# Patient Record
Sex: MALE | Race: OTHER | NOT HISPANIC OR LATINO
[De-identification: names, ages, dates, MRNs, and addresses within clinical notes are randomized per-mention and may not be internally consistent; named-entity substitution may affect disease eponyms.]

---

## 2019-03-25 ENCOUNTER — LABORATORY RESULT (OUTPATIENT)
Age: 34
End: 2019-03-25

## 2019-03-25 ENCOUNTER — APPOINTMENT (OUTPATIENT)
Dept: INTERNAL MEDICINE | Facility: CLINIC | Age: 34
End: 2019-03-25
Payer: COMMERCIAL

## 2019-03-25 VITALS
BODY MASS INDEX: 23.95 KG/M2 | HEIGHT: 68 IN | TEMPERATURE: 98.8 F | OXYGEN SATURATION: 98 % | SYSTOLIC BLOOD PRESSURE: 124 MMHG | DIASTOLIC BLOOD PRESSURE: 85 MMHG | WEIGHT: 158 LBS | HEART RATE: 87 BPM

## 2019-03-25 DIAGNOSIS — Z82.49 FAMILY HISTORY OF ISCHEMIC HEART DISEASE AND OTHER DISEASES OF THE CIRCULATORY SYSTEM: ICD-10-CM

## 2019-03-25 DIAGNOSIS — R20.0 ANESTHESIA OF SKIN: ICD-10-CM

## 2019-03-25 DIAGNOSIS — Z83.438 FAMILY HISTORY OF OTHER DISORDER OF LIPOPROTEIN METABOLISM AND OTHER LIPIDEMIA: ICD-10-CM

## 2019-03-25 DIAGNOSIS — Z72.0 TOBACCO USE: ICD-10-CM

## 2019-03-25 PROCEDURE — 36415 COLL VENOUS BLD VENIPUNCTURE: CPT

## 2019-03-25 PROCEDURE — 99385 PREV VISIT NEW AGE 18-39: CPT | Mod: 25

## 2019-03-25 PROCEDURE — 93000 ELECTROCARDIOGRAM COMPLETE: CPT

## 2019-03-25 PROCEDURE — G0444 DEPRESSION SCREEN ANNUAL: CPT

## 2019-03-25 NOTE — REVIEW OF SYSTEMS
[Nasal Discharge] : nasal discharge [Fever] : no fever [Chills] : no chills [Fatigue] : no fatigue [Vision Problems] : no vision problems [Earache] : no earache [Sore Throat] : no sore throat [Chest Pain] : no chest pain [Palpitations] : no palpitations [Shortness Of Breath] : no shortness of breath [Wheezing] : no wheezing [Cough] : no cough [Abdominal Pain] : no abdominal pain [Nausea] : no nausea [Constipation] : no constipation [Diarrhea] : no diarrhea [Vomiting] : no vomiting [Heartburn] : no heartburn [Dysuria] : no dysuria [Hematuria] : no hematuria [Headache] : no headache [Dizziness] : no dizziness [Depression] : no depression

## 2019-03-25 NOTE — HISTORY OF PRESENT ILLNESS
[FreeTextEntry1] : establish care, physical [de-identified] : 34yo M presents for annual physical exam.\par He states that for the past 4-5 months he has been having episodes of "chest numbness." Denies any acute pain/pressure, palpitations, SOB, cough. He states it can happen 1-2 times per month and is usually in a small area which can be located on his left or right side, sometimes under his arm.\par He also states that for the past month he has intermittent episodes of abdominal discomfort either on his left or right upper stomach area. He notices it when he doesn't eat for long period of time and then suddenly eats. Denies heartburn, N/V/D, blood in stool, urinary complaints. He actually states that this has improved overall and he has been symptom free for the past week.\par His last physical was in 2015 and he was told that he had elevated liver enzymes, but he did not follow up after that.\par He complains of nasal congestion for the past two days, denies sore throat but does feel a tickle in his throat. Denies fevers, chills.

## 2019-03-25 NOTE — HEALTH RISK ASSESSMENT
[Employed] : employed [Feels Safe at Home] : Feels safe at home [Smoke Detector] : smoke detector [Carbon Monoxide Detector] : carbon monoxide detector [Seat Belt] :  uses seat belt [Sunscreen] : uses sunscreen [0] : 2) Feeling down, depressed, or hopeless: Not at all (0) [HIV Test offered] : HIV Test offered [Hepatitis C test offered] : Hepatitis C test offered [None] : None [With Family] : lives with family [] :  [# Of Children ___] : has [unfilled] children [Sexually Active] : sexually active [Fully functional (bathing, dressing, toileting, transferring, walking, feeding)] : Fully functional (bathing, dressing, toileting, transferring, walking, feeding) [Fully functional (using the telephone, shopping, preparing meals, housekeeping, doing laundry, using] : Fully functional and needs no help or supervision to perform IADLs (using the telephone, shopping, preparing meals, housekeeping, doing laundry, using transportation, managing medications and managing finances) [] : No [de-identified] : smokes 1-2 cigarettes per day [[id number]] : 0 [Change in mental status noted] : No change in mental status noted [Language] : denies difficulty with language [High Risk Behavior] : no high risk behavior [Reports changes in hearing] : Reports no changes in hearing [Reports changes in vision] : Reports no changes in vision [Reports changes in dental health] : Reports no changes in dental health [FreeTextEntry2] :

## 2019-03-25 NOTE — PLAN
[FreeTextEntry1] : Healthcare Maintenance\par -routine blood work, follow up results\par -EKG reviewed\par -depression screening performed- 0\par \par Chest numbness?\par -encourage hydration and better sleep hygiene (patient admits to sleep deprivation since his daughter was born)\par -if develops chest pain, palpitations, symptoms worsen- follow up in office\par \par Tobacco abuse\par -patient has cut down from 1ppd to 1-2 cigarettes per day\par -encourage complete smoking cessation, discussed nicotine patch/gum, patient will work on this himself

## 2019-03-25 NOTE — PHYSICAL EXAM
[No Acute Distress] : no acute distress [Well Nourished] : well nourished [Well Developed] : well developed [Well-Appearing] : well-appearing [Normal Sclera/Conjunctiva] : normal sclera/conjunctiva [PERRL] : pupils equal round and reactive to light [Normal Oropharynx] : the oropharynx was normal [Supple] : supple [No Respiratory Distress] : no respiratory distress  [Clear to Auscultation] : lungs were clear to auscultation bilaterally [Normal Rate] : normal rate  [Regular Rhythm] : with a regular rhythm [Normal S1, S2] : normal S1 and S2 [No Edema] : there was no peripheral edema [Soft] : abdomen soft [Non Tender] : non-tender [Non-distended] : non-distended [Normal Bowel Sounds] : normal bowel sounds [No CVA Tenderness] : no CVA  tenderness [Grossly Normal Strength/Tone] : grossly normal strength/tone [Normal Gait] : normal gait [Coordination Grossly Intact] : coordination grossly intact [No Focal Deficits] : no focal deficits [Normal Affect] : the affect was normal [Alert and Oriented x3] : oriented to person, place, and time [Normal Insight/Judgement] : insight and judgment were intact

## 2019-03-26 ENCOUNTER — RESULT REVIEW (OUTPATIENT)
Age: 34
End: 2019-03-26

## 2019-03-26 DIAGNOSIS — E55.9 VITAMIN D DEFICIENCY, UNSPECIFIED: ICD-10-CM

## 2019-03-26 DIAGNOSIS — R74.0 NONSPECIFIC ELEVATION OF LEVELS OF TRANSAMINASE AND LACTIC ACID DEHYDROGENASE [LDH]: ICD-10-CM

## 2019-03-26 LAB
25(OH)D3 SERPL-MCNC: 12.5 NG/ML
ALBUMIN SERPL ELPH-MCNC: 4.9 G/DL
ALP BLD-CCNC: 95 U/L
ALT SERPL-CCNC: 102 U/L
ANION GAP SERPL CALC-SCNC: 14 MMOL/L
AST SERPL-CCNC: 58 U/L
BASOPHILS # BLD AUTO: 0.06 K/UL
BASOPHILS NFR BLD AUTO: 0.8 %
BILIRUB SERPL-MCNC: 2 MG/DL
BUN SERPL-MCNC: 14 MG/DL
CALCIUM SERPL-MCNC: 10.4 MG/DL
CHLORIDE SERPL-SCNC: 101 MMOL/L
CHOLEST SERPL-MCNC: 152 MG/DL
CHOLEST/HDLC SERPL: 3.9 RATIO
CO2 SERPL-SCNC: 26 MMOL/L
CREAT SERPL-MCNC: 0.99 MG/DL
EOSINOPHIL # BLD AUTO: 0.34 K/UL
EOSINOPHIL NFR BLD AUTO: 4.4 %
ESTIMATED AVERAGE GLUCOSE: 123 MG/DL
GLUCOSE SERPL-MCNC: 94 MG/DL
HBA1C MFR BLD HPLC: 5.9 %
HCT VFR BLD CALC: 47.5 %
HDLC SERPL-MCNC: 39 MG/DL
HGB BLD-MCNC: 15.7 G/DL
IMM GRANULOCYTES NFR BLD AUTO: 0.3 %
LDLC SERPL CALC-MCNC: 51 MG/DL
LYMPHOCYTES # BLD AUTO: 2.49 K/UL
LYMPHOCYTES NFR BLD AUTO: 31.9 %
MAN DIFF?: NORMAL
MCHC RBC-ENTMCNC: 27.9 PG
MCHC RBC-ENTMCNC: 33.1 GM/DL
MCV RBC AUTO: 84.5 FL
MONOCYTES # BLD AUTO: 0.76 K/UL
MONOCYTES NFR BLD AUTO: 9.7 %
NEUTROPHILS # BLD AUTO: 4.14 K/UL
NEUTROPHILS NFR BLD AUTO: 52.9 %
PLATELET # BLD AUTO: 176 K/UL
POTASSIUM SERPL-SCNC: 4.3 MMOL/L
PROT SERPL-MCNC: 8 G/DL
RBC # BLD: 5.62 M/UL
RBC # FLD: 13.1 %
SODIUM SERPL-SCNC: 141 MMOL/L
TRIGL SERPL-MCNC: 309 MG/DL
TSH SERPL-ACNC: 1.74 UIU/ML
WBC # FLD AUTO: 7.81 K/UL

## 2019-03-28 LAB
BILIRUB DIRECT SERPL-MCNC: 0.4 MG/DL
BILIRUB INDIRECT SERPL-MCNC: 1.6 MG/DL
BILIRUB SERPL-MCNC: 1.9 MG/DL
HAV IGM SER QL: NONREACTIVE
HBV CORE IGM SER QL: NONREACTIVE
HBV SURFACE AG SER QL: NONREACTIVE
HCV AB SER QL: ABNORMAL
HCV S/CO RATIO: 1.99 S/CO
HIV1+2 AB SPEC QL IA.RAPID: NONREACTIVE

## 2019-04-05 ENCOUNTER — FORM ENCOUNTER (OUTPATIENT)
Age: 34
End: 2019-04-05

## 2019-04-06 ENCOUNTER — OUTPATIENT (OUTPATIENT)
Dept: OUTPATIENT SERVICES | Facility: HOSPITAL | Age: 34
LOS: 1 days | End: 2019-04-06
Payer: COMMERCIAL

## 2019-04-06 ENCOUNTER — APPOINTMENT (OUTPATIENT)
Dept: ULTRASOUND IMAGING | Facility: IMAGING CENTER | Age: 34
End: 2019-04-06
Payer: COMMERCIAL

## 2019-04-06 DIAGNOSIS — R74.0 NONSPECIFIC ELEVATION OF LEVELS OF TRANSAMINASE AND LACTIC ACID DEHYDROGENASE [LDH]: ICD-10-CM

## 2019-04-06 PROCEDURE — 76705 ECHO EXAM OF ABDOMEN: CPT

## 2019-04-06 PROCEDURE — 76705 ECHO EXAM OF ABDOMEN: CPT | Mod: 26

## 2019-04-08 ENCOUNTER — RESULT REVIEW (OUTPATIENT)
Age: 34
End: 2019-04-08

## 2019-04-17 ENCOUNTER — APPOINTMENT (OUTPATIENT)
Dept: HEPATOLOGY | Facility: CLINIC | Age: 34
End: 2019-04-17
Payer: COMMERCIAL

## 2019-04-17 VITALS
SYSTOLIC BLOOD PRESSURE: 136 MMHG | BODY MASS INDEX: 23.34 KG/M2 | RESPIRATION RATE: 14 BRPM | WEIGHT: 154 LBS | DIASTOLIC BLOOD PRESSURE: 90 MMHG | TEMPERATURE: 97.9 F | HEART RATE: 71 BPM | HEIGHT: 68 IN

## 2019-04-17 PROCEDURE — 99204 OFFICE O/P NEW MOD 45 MIN: CPT

## 2019-04-17 NOTE — ASSESSMENT
[FreeTextEntry1] : 34 yo M originally from [place], with pre-diabetes (HbA1c 5.9%) and dyslipidemia (not yet on medication), with abnormal liver enzymes and hyperbilirubinemia.\par \par He has no evidence of chronic HBV or HCV infection. HCV Ab was weakly positive but HCV RNA was not detectable, suggesting a false positive antibody test or, less likely, prior spontaneously cleared infection. Given his history of transient jaundice and possible viral hepatitis 23-24 years ago in [place], will check HBcAb, HBsAb, HAV antibodies, and HEV antibodies for further evaluation. Suspect the episode was most likely acute HAV or HEV infection in an endemic region.\par \par His mild hyperbilirubinemia is predominantly indirect and could be related to Gilbert's syndrome, though direct bilirubin level was also very mildly elevated. His albumin level is normal, suggesting against liver synthetic dysfunction. Will follow-up INR, ordered with labs today.\par \par His liver enzyme elevations are most likely related to nonalcoholic fatty liver disease (NAFLD) given his risk factors of pre-diabetes and hypertriglyceridemia, though he is within the normal range for BMI. I have ordered labs to rule out other, less common etiologies for his liver test abnormalities.\par \par I have discussed with him at length regarding nonalcoholic fatty liver disease (NAFLD). I reviewed the natural history, evaluation and staging of the disease including elastography and potential need for liver biopsy, and prognosis, including possible risks of development of compensated cirrhosis, decompensated cirrhosis, and HCC.\par \par His calculated NAFLD fibrosis score is 0.239, which is indeterminate. I have ordered MR elastography for further evaluation and staging of his disease.\par \par I have discussed with him that lifestyle modifications are crucial for management of NAFLD. I recommended for him to maintain a healthy BMI and waist circumference. I recommended a heart-healthy, Mediterranean diet with lean proteins, healthy complex carbohydrates, avoidance of sugary foods and fructose / corn syrup, and avoidance of calorie-containing beverages. Coffee consumption was encouraged. I encouraged him to continue exercising, and to ensure that he is doing moderate or strenuous intensity exercise for at least 20 minutes at least 3 times per week. These changes have been shown to lead to regression or even resolution of steatosis, inflammation, and even fibrosis in some patients.\par \par We will discuss the possibility of pharmacologic treatments and/or clinical trial enrollment pending his laboratory and MRE results.\par \par He will return to clinic for follow-up in 4-6 weeks.

## 2019-04-17 NOTE — HISTORY OF PRESENT ILLNESS
[Travel to Endemic Area] : travel to an endemic area [Needlestick Exposure] : no needlestick exposure [Infected Sexual Partner] : no infected sexual partner [IV Drug Use] : no IV drug use [Tattoo] : no tattoos [Body Piercing] : no body piercing [Hemodialysis] : no hemodialysis [Transfusion before 1992] : no transfusion before 1992 [Incarceration] : no incarceration [Transplant before 1992] : no transplant before 1992 [Alcohol Abuse] : no alcohol abuse [Autoimmune Disorder] : no autoimmune disorder [Household Contact to HBV] : no household contact to HBV [Occupational Exposure] : no occupational exposure [Cocaine Use] : no cocaine use [de-identified] : Mr. Farr is a 32 yo M with recently-diagnosed pre-DM (HbA1c 5.9% on 3/25/19) and dyslipidemia (not yet on medical therapy), who was referred to Liver clinic due to abnormal liver enzymes and abnormal abdominal sonogram. His AST and ALT were elevated to 58 and 102, respectively, on routine labs done by his PCP (Dr. Rhonda Goldberg) on 3/25/19. He also had a mild, predominantly indirect hyperbilirubinemia (Tbili 1.9, DB 0.4). On prior labs from 12/31/15, he had elevated AST and ALT of 55 and 98, respectively, but bilirubin was within normal limits at 0.8.\par \par He reports feeling well with no complaints. He does report a remote history of acute likely viral hepatitis in 1995 or 1996, when he was still living in [facility], with jaundice and associated symptoms of fevers, RUQ pain, and nausea/vomiting, that subsequently resolved without need for antiviral treatment. His father had a similar illness around the same time. He otherwise has no history of jaundice, scleral icterus, overt GI bleeding, confusion, abdominal distension, or lower extremity swelling, and no other known FH of liver diseases.\par \par [place] from [place] to [facility] in [place]. He works as a  in the [employer] in [employer]. He has a female significant other and a 21-month-old daughter. He drinks alcohol on social occasions 1-2 times/month, drinking up to 4-5 alcoholic beverages per evening on those occasions. He used to smoke 1 ppd but cut down to 1-2 cigarettes/day. He denies any history of illicit drug use. \par \par He reports that his weight has been more or less stable recently. He recently started running and also joined a gym.\par \par RUQ US (4/6/19): Diffuse fatty infiltration of the liver. No hepatomegaly or focal mass. No ascites.

## 2019-04-17 NOTE — CONSULT LETTER
[Dear  ___] : Dear  [unfilled], [( Thank you for referring [unfilled] for consultation for _____ )] : Thank you for referring [unfilled] for consultation for [unfilled] [Please see my note below.] : Please see my note below. [Consult Closing:] : Thank you very much for allowing me to participate in the care of this patient.  If you have any questions, please do not hesitate to contact me. [FreeTextEntry3] : Sincerely,\par \par Vazquez Mari M.D., Ph.D.\par [facility]\par [address]\[place] [address]\par Tel: [phone]\par Fax: [phone]\par Cell: [phone]\par E-mail: [email or web address]  [FreeTextEntry2] : Dr. Rhonda Goldberg

## 2019-04-18 LAB
AFP-TM SERPL-MCNC: 1.8 NG/ML
ALBUMIN SERPL ELPH-MCNC: 5 G/DL
ALP BLD-CCNC: 86 U/L
ALT SERPL-CCNC: 96 U/L
ANION GAP SERPL CALC-SCNC: 17 MMOL/L
AST SERPL-CCNC: 55 U/L
BILIRUB DIRECT SERPL-MCNC: 0.3 MG/DL
BILIRUB SERPL-MCNC: 1.2 MG/DL
BUN SERPL-MCNC: 13 MG/DL
CALCIUM SERPL-MCNC: 9.7 MG/DL
CERULOPLASMIN SERPL-MCNC: 21 MG/DL
CHLORIDE SERPL-SCNC: 103 MMOL/L
CO2 SERPL-SCNC: 23 MMOL/L
CREAT SERPL-MCNC: 1.07 MG/DL
FERRITIN SERPL-MCNC: 135 NG/ML
GGT SERPL-CCNC: 60 U/L
GLUCOSE SERPL-MCNC: 104 MG/DL
HAV IGM SER QL: NONREACTIVE
HBV CORE IGG+IGM SER QL: NONREACTIVE
HBV SURFACE AB SERPL IA-ACNC: 8.2 MIU/ML
HEPATITIS A IGG ANTIBODY: REACTIVE
IGA SER QL IEP: 198 MG/DL
IGG SER QL IEP: 1214 MG/DL
IGM SER QL IEP: 120 MG/DL
INR PPP: 0.94 RATIO
IRON SATN MFR SERPL: 20 %
IRON SERPL-MCNC: 77 UG/DL
POTASSIUM SERPL-SCNC: 4.6 MMOL/L
PROT SERPL-MCNC: 7.5 G/DL
PT BLD: 10.8 SEC
SODIUM SERPL-SCNC: 143 MMOL/L
TIBC SERPL-MCNC: 384 UG/DL
UIBC SERPL-MCNC: 307 UG/DL

## 2019-05-09 LAB
A1AT PHENOTYP SERPL-IMP: NORMAL BANDS
A1AT SERPL-MCNC: 101 MG/DL
HEV AB SER QL: NEGATIVE
LYSOSOMAL ACID LIPASE INTERPRETATION: NORMAL
LYSOSOMAL ACID LIPASE: 134 CD:384473389
MITOCHONDRIA AB SER IF-ACNC: NORMAL
SMOOTH MUSCLE AB SER QL IF: NORMAL

## 2019-05-13 ENCOUNTER — APPOINTMENT (OUTPATIENT)
Dept: MRI IMAGING | Facility: CLINIC | Age: 34
End: 2019-05-13

## 2019-05-15 ENCOUNTER — APPOINTMENT (OUTPATIENT)
Dept: MRI IMAGING | Facility: CLINIC | Age: 34
End: 2019-05-15
Payer: COMMERCIAL

## 2019-05-15 ENCOUNTER — OUTPATIENT (OUTPATIENT)
Dept: OUTPATIENT SERVICES | Facility: HOSPITAL | Age: 34
LOS: 1 days | End: 2019-05-15
Payer: COMMERCIAL

## 2019-05-15 DIAGNOSIS — E80.6 OTHER DISORDERS OF BILIRUBIN METABOLISM: ICD-10-CM

## 2019-05-15 DIAGNOSIS — K76.0 FATTY (CHANGE OF) LIVER, NOT ELSEWHERE CLASSIFIED: ICD-10-CM

## 2019-05-15 PROCEDURE — 74183 MRI ABD W/O CNTR FLWD CNTR: CPT | Mod: 26

## 2019-05-15 PROCEDURE — A9585: CPT

## 2019-05-15 PROCEDURE — 76391 MR ELASTOGRAPHY: CPT

## 2019-05-15 PROCEDURE — 76391 MR ELASTOGRAPHY: CPT | Mod: 26

## 2019-05-15 PROCEDURE — 74183 MRI ABD W/O CNTR FLWD CNTR: CPT

## 2019-05-24 ENCOUNTER — APPOINTMENT (OUTPATIENT)
Age: 34
End: 2019-05-24
Payer: COMMERCIAL

## 2019-05-24 VITALS
WEIGHT: 145 LBS | HEART RATE: 63 BPM | TEMPERATURE: 98.1 F | SYSTOLIC BLOOD PRESSURE: 145 MMHG | BODY MASS INDEX: 21.98 KG/M2 | DIASTOLIC BLOOD PRESSURE: 89 MMHG | HEIGHT: 68 IN

## 2019-05-24 DIAGNOSIS — E80.6 OTHER DISORDERS OF BILIRUBIN METABOLISM: ICD-10-CM

## 2019-05-24 DIAGNOSIS — R73.03 PREDIABETES.: ICD-10-CM

## 2019-05-24 PROCEDURE — 90739 HEPB VACC 2/4 DOSE ADULT IM: CPT

## 2019-05-24 PROCEDURE — ZZZZZ: CPT

## 2019-05-24 PROCEDURE — 90471 IMMUNIZATION ADMIN: CPT

## 2019-05-24 PROCEDURE — 99214 OFFICE O/P EST MOD 30 MIN: CPT | Mod: 25

## 2019-05-24 NOTE — CONSULT LETTER
[Courtesy Letter:] : I had the pleasure of seeing your patient, [unfilled], in my office today. [Dear  ___] : Dear  [unfilled], [Please see my note below.] : Please see my note below. [Consult Closing:] : Thank you very much for allowing me to participate in the care of this patient.  If you have any questions, please do not hesitate to contact me. [FreeTextEntry2] : Dr. Rhonda Goldberg [FreeTextEntry3] : Sincerely,\par \par Vazqeuz Mari M.D., Ph.D.\par [facility]\par [address]\[place] [address]\par Tel: [phone]\par Fax: [phone]\par Cell: [phone]\par E-mail: [email or web address]\par

## 2019-05-24 NOTE — ASSESSMENT
[FreeTextEntry1] : 32 yo M originally from [facility], with pre-diabetes (HbA1c 5.9%) and dyslipidemia (not yet on medication), with abnormal liver enzymes and hyperbilirubinemia.\par \par He has no evidence of chronic HBV or HCV infection. HCV Ab was weakly positive but HCV RNA was not detectable, suggesting a false positive antibody test or, less likely, prior spontaneously cleared infection. He gave a history of transient jaundice and possible viral hepatitis as a child in [facility], but this may have been HAV as his serologies show HAV IgG positivity (now immune) but no evidence of prior exposure to HBV (sAg-, cAb-, sAb with low titer) or HEV (Ab negative). Laboratory work-up for other causes of his abnormal liver enzymes was negative\par \par His prior mild hyperbilirubinemia was predominantly indirect and likely related to Gilbert's syndrome. It has since resolved. His liver synthetic function remains normal.\par \par Given the laboratory work-up above and the results of his recent MRI/MRE, his liver enzyme elevations appear to be due to nonalcoholic fatty liver disease (NAFLD). Although he is within normal range for BMI, his risk factors include pre-diabetes and hypertriglyceridemia.\par \par I have discussed with him at length regarding nonalcoholic fatty liver disease (NAFLD). I reviewed the natural history, evaluation and staging of the disease including his recent MR elastography results which suggest that he has simple steatosis rather than nonalcoholic steatohepatitis (METCALF). We discussed prognosis, including possible risks of development of METCALF, compensated cirrhosis, decompensated cirrhosis, and HCC, as well as increased lifetime risks of cardiac disease and cancer with NAFLD.\par \par I have discussed with him that lifestyle modifications are crucial for management of NAFLD. I congratulated him on his changes thus far, including his recent weight loss through healthy dietary modifications and increased exercise. I recommended for him to maintain a healthy BMI and waist circumference. I recommended a heart-healthy, Mediterranean diet with lean proteins, healthy complex carbohydrates, avoidance of sugary foods and fructose / corn syrup, and avoidance of calorie-containing beverages. Coffee consumption was encouraged. I encouraged him to continue exercising, and to ensure that he is doing moderate or strenuous intensity exercise for at least 20 minutes at least 3 times per week. These changes have been shown to lead to regression or even resolution of steatosis.\par \par Mr. LEAVITT was counseled to: drink alcohol only in moderation (<2 drinks/day for men); avoid use of herbal and dietary supplements due to potential hepatotoxicity; and limit use of acetaminophen to <2 grams per day.\par \par He is non-immune to HBV and will receive the first injection today.\par \par He will return to clinic for follow-up in 1 year, with FibroScan at that time to assess for change in his hepatic steatosis and to monitor for any development of early fibrosis. He was also advised to have liver tests re-checked by his PMD Dr. Goldberg in 6 months.

## 2019-05-24 NOTE — HISTORY OF PRESENT ILLNESS
[Travel to Endemic Area] : travel to an endemic area [Needlestick Exposure] : no needlestick exposure [Infected Sexual Partner] : no infected sexual partner [IV Drug Use] : no IV drug use [Tattoo] : no tattoos [Body Piercing] : no body piercing [Hemodialysis] : no hemodialysis [Transfusion before 1992] : no transfusion before 1992 [Incarceration] : no incarceration [Transplant before 1992] : no transplant before 1992 [Alcohol Abuse] : no alcohol abuse [Autoimmune Disorder] : no autoimmune disorder [Occupational Exposure] : no occupational exposure [Household Contact to HBV] : no household contact to HBV [Cocaine Use] : no cocaine use [de-identified] : Mr. Farr is a 32 yo M with pre-DM (HbA1c 5.9% on 3/25/19) and dyslipidemia (not yet on medical therapy), who is being seen due to abnormal liver enzymes.\par \par PCP: Dr. Rhonda Goldberg = referring provider\par \par He was last seen in Liver clinic on 4/17/19. Since then, he underwent MRI/MRE on 5/15/19 that showed normal liver morphology, no focal hepatic lesion, hepatic fat fraction 16-18% consistent with moderate to severe steatosis, no hepatic iron deposition, and hepatic stiffness of 2.4 kPA (consistent with F0, or normal).\par \par Since his last visit, he started running 1.5 miles daily (usually takes him 12 minutes) and also doing some calisthenics like push-ups occasionally. He has also cut down on sugary foods and carbohydrates, including eating rice less often with his traditional  meals. He has successfully lost 9 lbs since his last visit.

## 2019-10-30 ENCOUNTER — APPOINTMENT (OUTPATIENT)
Dept: PULMONOLOGY | Facility: CLINIC | Age: 34
End: 2019-10-30
Payer: COMMERCIAL

## 2019-10-30 VITALS
DIASTOLIC BLOOD PRESSURE: 91 MMHG | SYSTOLIC BLOOD PRESSURE: 144 MMHG | HEART RATE: 70 BPM | TEMPERATURE: 98 F | OXYGEN SATURATION: 99 % | RESPIRATION RATE: 16 BRPM

## 2019-10-30 DIAGNOSIS — R06.00 DYSPNEA, UNSPECIFIED: ICD-10-CM

## 2019-10-30 PROCEDURE — 99244 OFF/OP CNSLTJ NEW/EST MOD 40: CPT | Mod: 25

## 2019-10-30 PROCEDURE — 71046 X-RAY EXAM CHEST 2 VIEWS: CPT

## 2019-10-30 PROCEDURE — 94729 DIFFUSING CAPACITY: CPT

## 2019-10-30 PROCEDURE — 94727 GAS DIL/WSHOT DETER LNG VOL: CPT

## 2019-10-30 PROCEDURE — 88738 HGB QUANT TRANSCUTANEOUS: CPT

## 2019-10-30 PROCEDURE — 94060 EVALUATION OF WHEEZING: CPT

## 2019-10-31 ENCOUNTER — APPOINTMENT (OUTPATIENT)
Dept: INTERNAL MEDICINE | Facility: CLINIC | Age: 34
End: 2019-10-31
Payer: COMMERCIAL

## 2019-10-31 VITALS
BODY MASS INDEX: 21.98 KG/M2 | WEIGHT: 145 LBS | HEART RATE: 86 BPM | HEIGHT: 68 IN | SYSTOLIC BLOOD PRESSURE: 141 MMHG | DIASTOLIC BLOOD PRESSURE: 96 MMHG | OXYGEN SATURATION: 96 % | TEMPERATURE: 98 F

## 2019-10-31 PROCEDURE — 36415 COLL VENOUS BLD VENIPUNCTURE: CPT

## 2019-10-31 PROCEDURE — 99213 OFFICE O/P EST LOW 20 MIN: CPT | Mod: 25

## 2019-10-31 RX ORDER — CHOLECALCIFEROL (VITAMIN D3) 1250 MCG
1.25 MG CAPSULE ORAL
Qty: 6 | Refills: 0 | Status: DISCONTINUED | COMMUNITY
Start: 2019-03-26 | End: 2019-10-31

## 2019-10-31 NOTE — CONSULT LETTER
[Dear  ___] : Dear  [unfilled], [Courtesy Letter:] : I had the pleasure of seeing your patient, [unfilled], in my office today. [Please see my note below.] : Please see my note below. [Consult Closing:] : Thank you very much for allowing me to participate in the care of this patient.  If you have any questions, please do not hesitate to contact me. [Sincerely,] : Sincerely, [FreeTextEntry3] : Dr. Carmelita Mccann

## 2019-10-31 NOTE — PHYSICAL EXAM
[No Acute Distress] : no acute distress [Well Nourished] : well nourished [Well Developed] : well developed [Well-Appearing] : well-appearing [No Respiratory Distress] : no respiratory distress  [No Accessory Muscle Use] : no accessory muscle use [Clear to Auscultation] : lungs were clear to auscultation bilaterally [Normal Rate] : normal rate  [Regular Rhythm] : with a regular rhythm [Normal S1, S2] : normal S1 and S2 [No Focal Deficits] : no focal deficits [Alert and Oriented x3] : oriented to person, place, and time

## 2019-10-31 NOTE — HISTORY OF PRESENT ILLNESS
[FreeTextEntry1] : Minnie is a pleasant 33-year-old [facility]  without significant past medical history, he was found to have mildly decreased FEV1 on PFT performed at a [facility] preemployment physical. He is here for pulmonary evaluation today. He appears comfortable, only has mild exertional dyspnea at times, no chest pain, cough, fever or chills.

## 2019-10-31 NOTE — ASSESSMENT
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

## 2019-10-31 NOTE — PROCEDURE
[FreeTextEntry1] : Chest x-ray PA and lateral views performed in my office today showed clear lungs, no evidence of infiltrates or pleural effusions.\par \par Pulmonary Function Test performed in my office today: Lung Volume: Within normal limits; Spirometry: Within normal limits with no improvement post bronchodilator, specifically, FEV1 is normal at 108% predicted; Diffusion: Within normal limits. SpO2 at rest on room air is 99%\par

## 2019-10-31 NOTE — HISTORY OF PRESENT ILLNESS
[FreeTextEntry1] : follow up [de-identified] : Patient presents for follow up visit, he needs paperwork signed for work. He will be undergoing a fitness test at work and requires clearance to do so. For h/o elevated LFTs he has been worked up by hepatologist Dr Mari. He is s/p fiberscan and imaging with diagnosis of NAFLD and advised on lifestyle modifications. He was told to have repeat LFTs in 6 months, he will have hepatology follow up for repeat imaging in 1 year. Denies any acute complaints today.

## 2019-11-01 LAB
ALBUMIN SERPL ELPH-MCNC: 4.6 G/DL
ALP BLD-CCNC: 75 U/L
ALT SERPL-CCNC: 82 U/L
AST SERPL-CCNC: 46 U/L
BILIRUB DIRECT SERPL-MCNC: 0.2 MG/DL
BILIRUB INDIRECT SERPL-MCNC: 0.9 MG/DL
BILIRUB SERPL-MCNC: 1.2 MG/DL
POCT - HEMOGLOBIN (HGB), QUANTITATIVE, TRANSCUTANEOUS: 13.9
PROT SERPL-MCNC: 7.1 G/DL

## 2020-05-20 ENCOUNTER — APPOINTMENT (OUTPATIENT)
Dept: HEPATOLOGY | Facility: CLINIC | Age: 35
End: 2020-05-20

## 2020-06-30 ENCOUNTER — APPOINTMENT (OUTPATIENT)
Dept: HEPATOLOGY | Facility: CLINIC | Age: 35
End: 2020-06-30

## 2020-08-03 ENCOUNTER — APPOINTMENT (OUTPATIENT)
Dept: HEPATOLOGY | Facility: CLINIC | Age: 35
End: 2020-08-03
Payer: COMMERCIAL

## 2020-08-03 PROCEDURE — 91200 LIVER ELASTOGRAPHY: CPT

## 2020-08-04 LAB
ALBUMIN SERPL ELPH-MCNC: 4.9 G/DL
ALP BLD-CCNC: 77 U/L
ALT SERPL-CCNC: 93 U/L
AST SERPL-CCNC: 53 U/L
BILIRUB DIRECT SERPL-MCNC: 0.2 MG/DL
BILIRUB INDIRECT SERPL-MCNC: 0.7 MG/DL
BILIRUB SERPL-MCNC: 1 MG/DL
PROT SERPL-MCNC: 7.3 G/DL

## 2020-08-19 ENCOUNTER — APPOINTMENT (OUTPATIENT)
Dept: HEPATOLOGY | Facility: CLINIC | Age: 35
End: 2020-08-19

## 2020-09-09 ENCOUNTER — APPOINTMENT (OUTPATIENT)
Dept: HEPATOLOGY | Facility: CLINIC | Age: 35
End: 2020-09-09
Payer: COMMERCIAL

## 2020-09-09 VITALS
DIASTOLIC BLOOD PRESSURE: 94 MMHG | OXYGEN SATURATION: 99 % | WEIGHT: 158 LBS | BODY MASS INDEX: 23.95 KG/M2 | HEART RATE: 71 BPM | HEIGHT: 68 IN | TEMPERATURE: 97 F | SYSTOLIC BLOOD PRESSURE: 150 MMHG

## 2020-09-09 DIAGNOSIS — R74.8 ABNORMAL LEVELS OF OTHER SERUM ENZYMES: ICD-10-CM

## 2020-09-09 PROCEDURE — 99213 OFFICE O/P EST LOW 20 MIN: CPT

## 2020-09-09 NOTE — HISTORY OF PRESENT ILLNESS
[Travel to Endemic Area] : travel to an endemic area [Needlestick Exposure] : no needlestick exposure [Infected Sexual Partner] : no infected sexual partner [IV Drug Use] : no IV drug use [Tattoo] : no tattoos [Body Piercing] : no body piercing [Hemodialysis] : no hemodialysis [Transfusion before 1992] : no transfusion before 1992 [Transplant before 1992] : no transplant before 1992 [Incarceration] : no incarceration [Alcohol Abuse] : no alcohol abuse [Autoimmune Disorder] : no autoimmune disorder [Household Contact to HBV] : no household contact to HBV [Occupational Exposure] : no occupational exposure [de-identified] : Mr. Farr is a 34 yo M with pre-DM (HbA1c 5.9% on 3/25/19) and dyslipidemia (not yet on medical therapy), who is being seen due to abnormal liver enzymes.\par \par PCP: Dr. Rhonda Goldberg = referring provider\par \par MRI/MRE (5/15/19): Normal liver morphology, no focal hepatic lesion, hepatic fat fraction 16-18% consistent with moderate to severe steatosis, no hepatic iron deposition, and hepatic stiffness of 2.4 kPA (consistent with F0, or normal).\par \par He was last seen by me on 5/24/19. Prior to that visit, he had previously lost some weight and had been running 1.5 miles/day and doing some calisthenics, and had also cut down on sugary foods and carbohydrates, including eating rice less often with his traditional [place] meals.\par \par Since his last visit, he regained 13 lbs. He says that he "got lazy" especially during the COVID-19 pandemic and has not been exercising at all anymore, even though there is a gym room that he could use where he works as an  for [employer]. 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 He feels that his diet has been overall steady recently, still trying to eat less rice and doesn't eat junk food, but he is not eating much fresh vegetables. He only drank alcohol twice in 2020 so far.\par \par He underwent FibroScan recently on 8/3/20 that showed median liver stiffness of 5.7 kPA (consistent with F0-1 fibrosis) and CAP score of 318 dB/m (consistent with S1 steatosis). [Cocaine Use] : no cocaine use

## 2020-09-09 NOTE — CONSULT LETTER
[Dear  ___] : Dear  [unfilled], [Please see my note below.] : Please see my note below. [Courtesy Letter:] : I had the pleasure of seeing your patient, [unfilled], in my office today. [Consult Closing:] : Thank you very much for allowing me to participate in the care of this patient.  If you have any questions, please do not hesitate to contact me. [FreeTextEntry2] : Dr. Rhonda Goldberg [FreeTextEntry3] : Sincerely,\par \par Vazquez Mari M.D., Ph.D.\par [facility]\par [address]\[place] [address]\par Tel: [phone]\par Fax: [phone]\par Cell: [phone]\par E-mail: [email or web address]\par

## 2020-09-09 NOTE — ASSESSMENT
[FreeTextEntry1] : 35 yo M originally from [facility], with pre-diabetes (HbA1c 5.9%) and dyslipidemia (not yet on medication), with abnormal liver enzymes secondary to nonalcoholic fatty liver disease (NAFLD), also with prior predominantly indirect hyperbilirubinemia likely secondary to Gilbert's syndrome.\par \par We discussed the diagnosis of nonalcoholic fatty liver disease (NAFLD) at length today. I reviewed the natural history, evaluation and staging of the disease including his FibroScan results, and prognosis, including possible risks of development of compensated cirrhosis, decompensated cirrhosis, and hepatocellular carcinoma (HCC) as well as increased risks of cardiovascular disease and non-hepatic malignancies.\par \par We discussed that lifestyle modifications are crucial for management of NAFLD. I recommended gradual weight loss of 5-10% of his body weight. I recommended dietary changes including coffee consumption (up to 3-4 cups/day if desired), adherence to a Mediterranean style diet with increased consumption of vegetables and lean proteins, avoidance of red meat and high fructose corn syrup, and avoidance of calorie-containing beverages. I recommended moderate intensity exercise for a minimum of 20-30 minutes at least 3 times per week. These changes have been shown to lead to regression or even resolution of steatosis, inflammation, and even fibrosis in some patients.\par \par Mr. LEAVITT was counseled to: drink alcohol only in moderation (<2 drinks/day for men); avoid use of herbal and dietary supplements due to potential hepatotoxicity; and limit use of acetaminophen to <2 grams per day.\par \par He is non-immune to HBV and needs vaccination.\par \par He will return for follow-up in 1 year, with same-day FibroScan. Will re-check liver tests and HbA1c in 6 and 12 months.

## 2020-09-23 ENCOUNTER — APPOINTMENT (OUTPATIENT)
Dept: INTERNAL MEDICINE | Facility: CLINIC | Age: 35
End: 2020-09-23

## 2020-09-29 ENCOUNTER — APPOINTMENT (OUTPATIENT)
Dept: INTERNAL MEDICINE | Facility: CLINIC | Age: 35
End: 2020-09-29
Payer: COMMERCIAL

## 2020-09-29 VITALS
DIASTOLIC BLOOD PRESSURE: 85 MMHG | SYSTOLIC BLOOD PRESSURE: 129 MMHG | OXYGEN SATURATION: 99 % | WEIGHT: 160 LBS | HEIGHT: 68 IN | HEART RATE: 74 BPM | BODY MASS INDEX: 24.25 KG/M2 | TEMPERATURE: 97.8 F

## 2020-09-29 DIAGNOSIS — Z00.00 ENCOUNTER FOR GENERAL ADULT MEDICAL EXAMINATION W/OUT ABNORMAL FINDINGS: ICD-10-CM

## 2020-09-29 DIAGNOSIS — Z23 ENCOUNTER FOR IMMUNIZATION: ICD-10-CM

## 2020-09-29 DIAGNOSIS — K76.0 FATTY (CHANGE OF) LIVER, NOT ELSEWHERE CLASSIFIED: ICD-10-CM

## 2020-09-29 DIAGNOSIS — Z13.31 ENCOUNTER FOR SCREENING FOR DEPRESSION: ICD-10-CM

## 2020-09-29 PROCEDURE — 99395 PREV VISIT EST AGE 18-39: CPT | Mod: 25

## 2020-09-29 PROCEDURE — G0444 DEPRESSION SCREEN ANNUAL: CPT

## 2020-09-29 PROCEDURE — G0008: CPT

## 2020-09-29 PROCEDURE — 90686 IIV4 VACC NO PRSV 0.5 ML IM: CPT

## 2020-09-29 PROCEDURE — 36415 COLL VENOUS BLD VENIPUNCTURE: CPT

## 2020-09-29 NOTE — PHYSICAL EXAM
[No Acute Distress] : no acute distress [Well Nourished] : well nourished [Well Developed] : well developed [Normal Sclera/Conjunctiva] : normal sclera/conjunctiva [PERRL] : pupils equal round and reactive to light [Normal Outer Ear/Nose] : the outer ears and nose were normal in appearance [No Respiratory Distress] : no respiratory distress  [No Accessory Muscle Use] : no accessory muscle use [Clear to Auscultation] : lungs were clear to auscultation bilaterally [Normal Rate] : normal rate  [Regular Rhythm] : with a regular rhythm [Normal S1, S2] : normal S1 and S2 [No Edema] : there was no peripheral edema [Soft] : abdomen soft [Non Tender] : non-tender [Non-distended] : non-distended [Normal Bowel Sounds] : normal bowel sounds [No CVA Tenderness] : no CVA  tenderness [Grossly Normal Strength/Tone] : grossly normal strength/tone [Coordination Grossly Intact] : coordination grossly intact [No Focal Deficits] : no focal deficits [Normal Gait] : normal gait [Normal Affect] : the affect was normal [Alert and Oriented x3] : oriented to person, place, and time [Normal Insight/Judgement] : insight and judgment were intact

## 2020-09-29 NOTE — HISTORY OF PRESENT ILLNESS
[FreeTextEntry1] : CPE [de-identified] : Patient presents for CPE. \par He has no acute complaints today.\par He was seen by Dr Mari recently for transaminitis, to follow up in 1 year and will repeat fibroscan at that time. He is not immune to hep B, states he did start series with Dr Mari but unsure?

## 2020-09-29 NOTE — HEALTH RISK ASSESSMENT
[] : Yes [Yes] : Yes [Monthly or less (1 pt)] : Monthly or less (1 point) [1 or 2 (0 pts)] : 1 or 2 (0 points) [Never (0 pts)] : Never (0 points) [No] : In the past 12 months have you used drugs other than those required for medical reasons? No [0] : 2) Feeling down, depressed, or hopeless: Not at all (0) [None] : None [With Family] : lives with family [Employed] : employed [] :  [# Of Children ___] : has [unfilled] children [Feels Safe at Home] : Feels safe at home [de-identified] : 1-2 cigarettes per day [Audit-CScore] : 1 [[id number]] : 0 [Change in mental status noted] : No change in mental status noted [Language] : denies difficulty with language [Reports changes in hearing] : Reports no changes in hearing [Reports changes in vision] : Reports no changes in vision [Reports changes in dental health] : Reports no changes in dental health

## 2020-09-29 NOTE — PLAN
[FreeTextEntry1] : HCM\par -routine labs\par -depression screen negative\par -flu shot today\par -unsure of Hep B vaccine status, will reach out to Dr Mari and he will return for vaccination as needed\par NAFLD\par -encourage lifestyle modifications\par -hepatology follow up 1 year

## 2020-09-30 LAB
25(OH)D3 SERPL-MCNC: 21 NG/ML
ALBUMIN SERPL ELPH-MCNC: 5 G/DL
ALP BLD-CCNC: 91 U/L
ALT SERPL-CCNC: 97 U/L
ANION GAP SERPL CALC-SCNC: 13 MMOL/L
AST SERPL-CCNC: 45 U/L
BASOPHILS # BLD AUTO: 0.05 K/UL
BASOPHILS NFR BLD AUTO: 0.6 %
BILIRUB SERPL-MCNC: 0.8 MG/DL
BUN SERPL-MCNC: 14 MG/DL
CALCIUM SERPL-MCNC: 9.8 MG/DL
CHLORIDE SERPL-SCNC: 104 MMOL/L
CHOLEST SERPL-MCNC: 142 MG/DL
CHOLEST/HDLC SERPL: 4.5 RATIO
CO2 SERPL-SCNC: 25 MMOL/L
CREAT SERPL-MCNC: 1 MG/DL
EOSINOPHIL # BLD AUTO: 0.19 K/UL
EOSINOPHIL NFR BLD AUTO: 2.3 %
ESTIMATED AVERAGE GLUCOSE: 120 MG/DL
GLUCOSE SERPL-MCNC: 106 MG/DL
HBA1C MFR BLD HPLC: 5.8 %
HCT VFR BLD CALC: 46.6 %
HDLC SERPL-MCNC: 31 MG/DL
HGB BLD-MCNC: 14.7 G/DL
IMM GRANULOCYTES NFR BLD AUTO: 0.2 %
LDLC SERPL CALC-MCNC: NORMAL MG/DL
LYMPHOCYTES # BLD AUTO: 3.8 K/UL
LYMPHOCYTES NFR BLD AUTO: 45.8 %
MAN DIFF?: NORMAL
MCHC RBC-ENTMCNC: 27.6 PG
MCHC RBC-ENTMCNC: 31.5 GM/DL
MCV RBC AUTO: 87.4 FL
MONOCYTES # BLD AUTO: 0.56 K/UL
MONOCYTES NFR BLD AUTO: 6.8 %
NEUTROPHILS # BLD AUTO: 3.67 K/UL
NEUTROPHILS NFR BLD AUTO: 44.3 %
PLATELET # BLD AUTO: 172 K/UL
POTASSIUM SERPL-SCNC: 4.5 MMOL/L
PROT SERPL-MCNC: 7.1 G/DL
RBC # BLD: 5.33 M/UL
RBC # FLD: 13.2 %
SODIUM SERPL-SCNC: 142 MMOL/L
TRIGL SERPL-MCNC: 446 MG/DL
TSH SERPL-ACNC: 3.52 UIU/ML
WBC # FLD AUTO: 8.29 K/UL

## 2020-10-06 ENCOUNTER — APPOINTMENT (OUTPATIENT)
Dept: INTERNAL MEDICINE | Facility: CLINIC | Age: 35
End: 2020-10-06
Payer: COMMERCIAL

## 2020-10-06 DIAGNOSIS — E78.5 HYPERLIPIDEMIA, UNSPECIFIED: ICD-10-CM

## 2020-10-06 LAB
CHOLEST SERPL-MCNC: 149 MG/DL
CHOLEST/HDLC SERPL: 3.9 RATIO
HDLC SERPL-MCNC: 39 MG/DL
LDLC SERPL CALC-MCNC: 59 MG/DL
TRIGL SERPL-MCNC: 256 MG/DL

## 2020-10-06 PROCEDURE — 90746 HEPB VACCINE 3 DOSE ADULT IM: CPT

## 2020-10-06 PROCEDURE — 90471 IMMUNIZATION ADMIN: CPT

## 2020-10-06 PROCEDURE — 36415 COLL VENOUS BLD VENIPUNCTURE: CPT

## 2020-11-12 ENCOUNTER — APPOINTMENT (OUTPATIENT)
Dept: INTERNAL MEDICINE | Facility: CLINIC | Age: 35
End: 2020-11-12
Payer: COMMERCIAL

## 2020-11-12 DIAGNOSIS — Z23 ENCOUNTER FOR IMMUNIZATION: ICD-10-CM

## 2020-11-12 PROCEDURE — 99072 ADDL SUPL MATRL&STAF TM PHE: CPT

## 2020-11-12 PROCEDURE — 90471 IMMUNIZATION ADMIN: CPT

## 2020-11-12 PROCEDURE — 90746 HEPB VACCINE 3 DOSE ADULT IM: CPT

## 2021-05-27 ENCOUNTER — APPOINTMENT (OUTPATIENT)
Dept: INTERNAL MEDICINE | Facility: CLINIC | Age: 36
End: 2021-05-27
Payer: COMMERCIAL

## 2021-05-27 ENCOUNTER — NON-APPOINTMENT (OUTPATIENT)
Age: 36
End: 2021-05-27

## 2021-05-27 LAB
ESTIMATED AVERAGE GLUCOSE: 120 MG/DL
HBA1C MFR BLD HPLC: 5.8 %

## 2021-05-27 PROCEDURE — 90471 IMMUNIZATION ADMIN: CPT

## 2021-05-27 PROCEDURE — 90746 HEPB VACCINE 3 DOSE ADULT IM: CPT

## 2021-05-28 LAB
ALBUMIN SERPL ELPH-MCNC: 4.7 G/DL
ALP BLD-CCNC: 69 U/L
ALT SERPL-CCNC: 70 U/L
ANION GAP SERPL CALC-SCNC: 13 MMOL/L
AST SERPL-CCNC: 43 U/L
BILIRUB SERPL-MCNC: 1.1 MG/DL
BUN SERPL-MCNC: 11 MG/DL
CALCIUM SERPL-MCNC: 9.7 MG/DL
CHLORIDE SERPL-SCNC: 104 MMOL/L
CO2 SERPL-SCNC: 24 MMOL/L
CREAT SERPL-MCNC: 0.98 MG/DL
GLUCOSE SERPL-MCNC: 106 MG/DL
POTASSIUM SERPL-SCNC: 5.1 MMOL/L
PROT SERPL-MCNC: 7.5 G/DL
SODIUM SERPL-SCNC: 142 MMOL/L

## 2021-06-10 ENCOUNTER — APPOINTMENT (OUTPATIENT)
Dept: INTERNAL MEDICINE | Facility: CLINIC | Age: 36
End: 2021-06-10
Payer: COMMERCIAL

## 2021-06-10 VITALS
HEART RATE: 79 BPM | SYSTOLIC BLOOD PRESSURE: 127 MMHG | DIASTOLIC BLOOD PRESSURE: 85 MMHG | WEIGHT: 157 LBS | HEIGHT: 68 IN | TEMPERATURE: 97.9 F | BODY MASS INDEX: 23.79 KG/M2 | OXYGEN SATURATION: 98 %

## 2021-06-10 DIAGNOSIS — Z02.89 ENCOUNTER FOR OTHER ADMINISTRATIVE EXAMINATIONS: ICD-10-CM

## 2021-06-10 PROCEDURE — 99213 OFFICE O/P EST LOW 20 MIN: CPT

## 2021-06-10 NOTE — PHYSICAL EXAM
[No Acute Distress] : no acute distress [Well Nourished] : well nourished [No Respiratory Distress] : no respiratory distress  [No Accessory Muscle Use] : no accessory muscle use [Clear to Auscultation] : lungs were clear to auscultation bilaterally [Normal Rate] : normal rate  [Regular Rhythm] : with a regular rhythm [Normal S1, S2] : normal S1 and S2 [No Focal Deficits] : no focal deficits [Alert and Oriented x3] : oriented to person, place, and time

## 2021-06-10 NOTE — HISTORY OF PRESENT ILLNESS
[de-identified] : Patient here for form completion. He is applying for job at [employer] and needs to be cleared to have fitness physical. \par Denies CP, palp, SOB, MCCOY, HA, dizziness. He generally does run for exercise, strength training with sit ups/push ups. \par He has completed several physical fitness exams for job in the past, has to repeat for new department.

## 2021-07-08 ENCOUNTER — APPOINTMENT (OUTPATIENT)
Dept: SPEECH THERAPY | Facility: CLINIC | Age: 36
End: 2021-07-08

## 2021-07-09 ENCOUNTER — APPOINTMENT (OUTPATIENT)
Dept: INTERNAL MEDICINE | Facility: CLINIC | Age: 36
End: 2021-07-09
Payer: COMMERCIAL

## 2021-07-09 ENCOUNTER — NON-APPOINTMENT (OUTPATIENT)
Age: 36
End: 2021-07-09

## 2021-07-09 VITALS
TEMPERATURE: 98 F | OXYGEN SATURATION: 97 % | DIASTOLIC BLOOD PRESSURE: 84 MMHG | BODY MASS INDEX: 23.19 KG/M2 | HEART RATE: 72 BPM | HEIGHT: 68 IN | WEIGHT: 153 LBS | SYSTOLIC BLOOD PRESSURE: 131 MMHG

## 2021-07-09 DIAGNOSIS — Z02.1 ENCOUNTER FOR PRE-EMPLOYMENT EXAMINATION: ICD-10-CM

## 2021-07-09 PROCEDURE — 99213 OFFICE O/P EST LOW 20 MIN: CPT | Mod: 25

## 2021-07-09 NOTE — PLAN
[FreeTextEntry1] : CAILIN and jayshree ordered- f/u results\par Paperwork completion pending results\par Audiology referral provided per employer requirement\par Eye exam scanned into chart

## 2021-07-09 NOTE — HISTORY OF PRESENT ILLNESS
[de-identified] : Patient presents for pre employment exam, requires UA and TB screening.\par No new complaints.

## 2021-07-09 NOTE — PHYSICAL EXAM
[No Acute Distress] : no acute distress [Well Nourished] : well nourished [Well Developed] : well developed [No Respiratory Distress] : no respiratory distress  [No Accessory Muscle Use] : no accessory muscle use [Clear to Auscultation] : lungs were clear to auscultation bilaterally [Normal Rate] : normal rate  [Regular Rhythm] : with a regular rhythm [Normal S1, S2] : normal S1 and S2 [No Edema] : there was no peripheral edema [Soft] : abdomen soft [Non Tender] : non-tender [Non-distended] : non-distended [Normal Bowel Sounds] : normal bowel sounds [No Focal Deficits] : no focal deficits [Alert and Oriented x3] : oriented to person, place, and time

## 2021-07-12 LAB
APPEARANCE: CLEAR
BILIRUBIN URINE: NEGATIVE
BLOOD URINE: NEGATIVE
COLOR: COLORLESS
GLUCOSE QUALITATIVE U: NEGATIVE
KETONES URINE: NEGATIVE
LEUKOCYTE ESTERASE URINE: NEGATIVE
M TB IFN-G BLD-IMP: NEGATIVE
NITRITE URINE: NEGATIVE
PH URINE: 6.5
PROTEIN URINE: NEGATIVE
QUANTIFERON TB PLUS MITOGEN MINUS NIL: 8.44 IU/ML
QUANTIFERON TB PLUS NIL: 0.02 IU/ML
QUANTIFERON TB PLUS TB1 MINUS NIL: -0.01 IU/ML
QUANTIFERON TB PLUS TB2 MINUS NIL: 0 IU/ML
SPECIFIC GRAVITY URINE: 1.01
UROBILINOGEN URINE: NORMAL

## 2021-07-13 ENCOUNTER — APPOINTMENT (OUTPATIENT)
Dept: SPEECH THERAPY | Facility: CLINIC | Age: 36
End: 2021-07-13

## 2024-08-30 ENCOUNTER — APPOINTMENT (OUTPATIENT)
Dept: INTERNAL MEDICINE | Facility: CLINIC | Age: 39
End: 2024-08-30

## 2025-08-05 ENCOUNTER — EMERGENCY (EMERGENCY)
Facility: HOSPITAL | Age: 40
LOS: 1 days | End: 2025-08-05
Admitting: EMERGENCY MEDICINE
Payer: COMMERCIAL

## 2025-08-05 VITALS
RESPIRATION RATE: 18 BRPM | SYSTOLIC BLOOD PRESSURE: 157 MMHG | HEART RATE: 100 BPM | OXYGEN SATURATION: 100 % | DIASTOLIC BLOOD PRESSURE: 105 MMHG | TEMPERATURE: 98 F | WEIGHT: 167.99 LBS

## 2025-08-05 PROCEDURE — 73030 X-RAY EXAM OF SHOULDER: CPT | Mod: 26,LT

## 2025-08-05 PROCEDURE — 73060 X-RAY EXAM OF HUMERUS: CPT | Mod: 26,LT

## 2025-08-05 PROCEDURE — 99284 EMERGENCY DEPT VISIT MOD MDM: CPT

## 2025-08-05 RX ORDER — CYCLOBENZAPRINE HYDROCHLORIDE 15 MG/1
1 CAPSULE, EXTENDED RELEASE ORAL
Qty: 12 | Refills: 0
Start: 2025-08-05 | End: 2025-08-08

## 2025-08-05 RX ORDER — IBUPROFEN 200 MG
800 TABLET ORAL ONCE
Refills: 0 | Status: COMPLETED | OUTPATIENT
Start: 2025-08-05 | End: 2025-08-05

## 2025-08-05 RX ORDER — DICLOFENAC SODIUM 75 MG/1
1 TABLET, DELAYED RELEASE ORAL
Qty: 10 | Refills: 0
Start: 2025-08-05 | End: 2025-08-09

## 2025-08-05 RX ADMIN — Medication 800 MILLIGRAM(S): at 22:26
